# Patient Record
Sex: MALE | Race: WHITE | NOT HISPANIC OR LATINO | Employment: FULL TIME | URBAN - METROPOLITAN AREA
[De-identification: names, ages, dates, MRNs, and addresses within clinical notes are randomized per-mention and may not be internally consistent; named-entity substitution may affect disease eponyms.]

---

## 2018-01-03 ENCOUNTER — ALLSCRIPTS OFFICE VISIT (OUTPATIENT)
Dept: OTHER | Facility: OTHER | Age: 43
End: 2018-01-03

## 2018-01-03 ENCOUNTER — GENERIC CONVERSION - ENCOUNTER (OUTPATIENT)
Dept: OTHER | Facility: OTHER | Age: 43
End: 2018-01-03

## 2018-01-23 VITALS
BODY MASS INDEX: 26.04 KG/M2 | DIASTOLIC BLOOD PRESSURE: 84 MMHG | SYSTOLIC BLOOD PRESSURE: 130 MMHG | HEIGHT: 71 IN | WEIGHT: 186 LBS

## 2019-01-03 DIAGNOSIS — N40.0 ENLARGED PROSTATE WITHOUT LOWER URINARY TRACT SYMPTOMS (LUTS): ICD-10-CM

## 2019-01-16 ENCOUNTER — OFFICE VISIT (OUTPATIENT)
Dept: UROLOGY | Facility: MEDICAL CENTER | Age: 44
End: 2019-01-16
Payer: COMMERCIAL

## 2019-01-16 VITALS
WEIGHT: 185 LBS | HEART RATE: 57 BPM | HEIGHT: 70 IN | BODY MASS INDEX: 26.48 KG/M2 | SYSTOLIC BLOOD PRESSURE: 122 MMHG | DIASTOLIC BLOOD PRESSURE: 78 MMHG

## 2019-01-16 DIAGNOSIS — Z80.42 FAMILY HISTORY OF PROSTATE CANCER IN FATHER: ICD-10-CM

## 2019-01-16 DIAGNOSIS — N40.1 BENIGN PROSTATIC HYPERPLASIA WITH LOWER URINARY TRACT SYMPTOMS, SYMPTOM DETAILS UNSPECIFIED: Primary | ICD-10-CM

## 2019-01-16 LAB
POST-VOID RESIDUAL VOLUME, ML POC: 23 ML
SL AMB  POCT GLUCOSE, UA: NORMAL
SL AMB LEUKOCYTE ESTERASE,UA: NORMAL
SL AMB POCT BILIRUBIN,UA: NORMAL
SL AMB POCT BLOOD,UA: NORMAL
SL AMB POCT CLARITY,UA: CLEAR
SL AMB POCT COLOR,UA: NORMAL
SL AMB POCT KETONES,UA: NORMAL
SL AMB POCT NITRITE,UA: NORMAL
SL AMB POCT PH,UA: 6
SL AMB POCT SPECIFIC GRAVITY,UA: 1.02
SL AMB POCT URINE PROTEIN: NORMAL
SL AMB POCT UROBILINOGEN: 0.2

## 2019-01-16 PROCEDURE — 87086 URINE CULTURE/COLONY COUNT: CPT | Performed by: UROLOGY

## 2019-01-16 PROCEDURE — 51798 US URINE CAPACITY MEASURE: CPT | Performed by: UROLOGY

## 2019-01-16 PROCEDURE — 81003 URINALYSIS AUTO W/O SCOPE: CPT | Performed by: UROLOGY

## 2019-01-16 PROCEDURE — 99214 OFFICE O/P EST MOD 30 MIN: CPT | Performed by: UROLOGY

## 2019-01-16 NOTE — LETTER
January 16, 2019     Norris Gonzales MD  [facility] [id number]    Patient: Maddi Wynn   YOB: 1975   Date of Visit: 1/16/2019       Dear Dr Abdulkadir Esquivel:    Thank you for referring Dino Morales to me for evaluation  Below are my notes for this consultation  If you have questions, please do not hesitate to call me  I look forward to following your patient along with you  Sincerely,        Cehnte Casas MD        CC: No Recipients  Chente Casas MD  1/16/2019 10:22 AM  Sign at close encounter  Assessment/Plan:  1  BPH with lower urinary tract symptoms-plan cystoscopy, PVR only 23 cc, urine culture and sensitivity because a low-grade leukocyte esterase on urinalysis, PSA and chemistry profile  No problem-specific Assessment & Plan notes found for this encounter  Diagnoses and all orders for this visit:    Benign prostatic hyperplasia with lower urinary tract symptoms, symptom details unspecified  -     POCT Measure PVR  -     POCT urine dip auto non-scope  -     PSA, total and free; Future  -     Comprehensive metabolic panel; Future  -     Cystoscopy; Future  -     Urine culture; Future    Family history of prostate cancer in father          Subjective:      Patient ID: Maddi Wynn is a 37 y o  male  Chief complaint:  Voiding dysfunction    History of present illness this is a 80-year-old male with a father who had prostate cancer who presents for yearly prostate examination  The patient also notes some mild to moderate feelings of incomplete bladder emptying urinary frequency and some straining to urinate with more significant symptoms with respect to urinary intermittency weakening of the urinary stream and urinary urgency  He notes nocturia 0-1 time per night with an AUA symptom score of 19  He denies dysuria and gross hematuria noting no urinary incontinence    He denies any testicular or adnexal discomfort since previous vasectomy  Benign Prostatic Hypertrophy         The following portions of the patient's history were reviewed and updated as appropriate: allergies, current medications, past family history, past medical history, past social history, past surgical history and problem list     Review of Systems   Genitourinary:        See HPI   All other systems reviewed and are negative  Objective:      /78   Pulse 57   Ht 5' 10" (1 778 m)   Wt 83 9 kg (185 lb)   BMI 26 54 kg/m²           Physical Exam   Constitutional: He is oriented to person, place, and time  He appears well-developed and well-nourished  HENT:   Head: Normocephalic and atraumatic  Eyes: Conjunctivae and EOM are normal    Neck: Neck supple  Pulmonary/Chest: Effort normal  No respiratory distress  Abdominal: Soft  He exhibits no distension  There is no tenderness  There is no rebound  Genitourinary:   Genitourinary Comments: Phallus normal meatus patent normally placed  No cutaneous lesions, prepuce redundant  Scrotum normal without cutaneous lesions  Testes adnexa normal without masses induration tenderness or adnexal abnormality  Some slight nodularity of the spermatic cords in the area of the vasectomy is noted  Digital rectal examination reveals a normal anal verge normal anal sphincter tone no palpable rectal masses and a 25 g a nodular nontender prostate  Neurological: He is alert and oriented to person, place, and time  Skin: Skin is warm  Psychiatric: He has a normal mood and affect  His behavior is normal  Judgment and thought content normal    Vitals reviewed

## 2019-01-16 NOTE — PROGRESS NOTES
Assessment/Plan:  1  BPH with lower urinary tract symptoms-plan cystoscopy, PVR only 23 cc, urine culture and sensitivity because a low-grade leukocyte esterase on urinalysis, PSA and chemistry profile  No problem-specific Assessment & Plan notes found for this encounter  Diagnoses and all orders for this visit:    Benign prostatic hyperplasia with lower urinary tract symptoms, symptom details unspecified  -     POCT Measure PVR  -     POCT urine dip auto non-scope  -     PSA, total and free; Future  -     Comprehensive metabolic panel; Future  -     Cystoscopy; Future  -     Urine culture; Future    Family history of prostate cancer in father          Subjective:      Patient ID: Raleigh German is a 37 y o  male  Chief complaint:  Voiding dysfunction    History of present illness this is a 19-year-old male with a father who had prostate cancer who presents for yearly prostate examination  The patient also notes some mild to moderate feelings of incomplete bladder emptying urinary frequency and some straining to urinate with more significant symptoms with respect to urinary intermittency weakening of the urinary stream and urinary urgency  He notes nocturia 0-1 time per night with an AUA symptom score of 19  He denies dysuria and gross hematuria noting no urinary incontinence  He denies any testicular or adnexal discomfort since previous vasectomy  Benign Prostatic Hypertrophy         The following portions of the patient's history were reviewed and updated as appropriate: allergies, current medications, past family history, past medical history, past social history, past surgical history and problem list     Review of Systems   Genitourinary:        See HPI   All other systems reviewed and are negative          Objective:      /78   Pulse 57   Ht 5' 10" (1 778 m)   Wt 83 9 kg (185 lb)   BMI 26 54 kg/m²          Physical Exam   Constitutional: He is oriented to person, place, and time  He appears well-developed and well-nourished  HENT:   Head: Normocephalic and atraumatic  Eyes: Conjunctivae and EOM are normal    Neck: Neck supple  Pulmonary/Chest: Effort normal  No respiratory distress  Abdominal: Soft  He exhibits no distension  There is no tenderness  There is no rebound  Genitourinary:   Genitourinary Comments: Phallus normal meatus patent normally placed  No cutaneous lesions, prepuce redundant  Scrotum normal without cutaneous lesions  Testes adnexa normal without masses induration tenderness or adnexal abnormality  Some slight nodularity of the spermatic cords in the area of the vasectomy is noted  Digital rectal examination reveals a normal anal verge normal anal sphincter tone no palpable rectal masses and a 25 g a nodular nontender prostate  Neurological: He is alert and oriented to person, place, and time  Skin: Skin is warm  Psychiatric: He has a normal mood and affect  His behavior is normal  Judgment and thought content normal    Vitals reviewed

## 2019-01-17 LAB — BACTERIA UR CULT: NORMAL

## 2019-02-28 ENCOUNTER — PROCEDURE VISIT (OUTPATIENT)
Dept: UROLOGY | Facility: MEDICAL CENTER | Age: 44
End: 2019-02-28
Payer: COMMERCIAL

## 2019-02-28 VITALS
BODY MASS INDEX: 26.48 KG/M2 | DIASTOLIC BLOOD PRESSURE: 86 MMHG | SYSTOLIC BLOOD PRESSURE: 140 MMHG | HEIGHT: 70 IN | WEIGHT: 185 LBS | HEART RATE: 75 BPM

## 2019-02-28 DIAGNOSIS — Z80.42 FAMILY HISTORY OF PROSTATE CANCER IN FATHER: ICD-10-CM

## 2019-02-28 DIAGNOSIS — N40.1 BENIGN PROSTATIC HYPERPLASIA WITH LOWER URINARY TRACT SYMPTOMS, SYMPTOM DETAILS UNSPECIFIED: Primary | ICD-10-CM

## 2019-02-28 LAB
SL AMB  POCT GLUCOSE, UA: ABNORMAL
SL AMB LEUKOCYTE ESTERASE,UA: ABNORMAL
SL AMB POCT BILIRUBIN,UA: ABNORMAL
SL AMB POCT BLOOD,UA: ABNORMAL
SL AMB POCT CLARITY,UA: CLEAR
SL AMB POCT COLOR,UA: YELLOW
SL AMB POCT KETONES,UA: ABNORMAL
SL AMB POCT NITRITE,UA: ABNORMAL
SL AMB POCT PH,UA: 5
SL AMB POCT SPECIFIC GRAVITY,UA: 1.02
SL AMB POCT URINE PROTEIN: ABNORMAL
SL AMB POCT UROBILINOGEN: 0.2

## 2019-02-28 PROCEDURE — 99213 OFFICE O/P EST LOW 20 MIN: CPT | Performed by: UROLOGY

## 2019-02-28 PROCEDURE — 81003 URINALYSIS AUTO W/O SCOPE: CPT | Performed by: UROLOGY

## 2019-02-28 PROCEDURE — 52000 CYSTOURETHROSCOPY: CPT | Performed by: UROLOGY

## 2019-02-28 RX ORDER — SULFAMETHOXAZOLE AND TRIMETHOPRIM 800; 160 MG/1; MG/1
1 TABLET ORAL EVERY 12 HOURS SCHEDULED
Qty: 4 TABLET | Refills: 0 | Status: SHIPPED | OUTPATIENT
Start: 2019-02-28 | End: 2019-03-02

## 2019-02-28 NOTE — PROGRESS NOTES
Assessment/Plan:  1  Family history prostate cancer-no evidence of disease in this patient-repeat STANTON and PSA in 1 year    2  Weakening of the urinary stream-no evidence of significant bladder outlet obstruction  Will watch symptom complex and if this progresses consideration towards alpha blockade will be made  If that proves the patient's urinary flow consideration towards Uro lift will be made however my preference is not to introduce any of these treatments it unless necessary in this young patient  No problem-specific Assessment & Plan notes found for this encounter  Diagnoses and all orders for this visit:    Benign prostatic hyperplasia with lower urinary tract symptoms, symptom details unspecified  -     sulfamethoxazole-trimethoprim (BACTRIM DS) 800-160 mg per tablet; Take 1 tablet by mouth every 12 (twelve) hours for 2 days  -     PSA Total, Diagnostic; Future  -     Comprehensive metabolic panel; Future    Family history of prostate cancer in father  -     PSA Total, Diagnostic; Future    Other orders  -     Cystoscopy          Subjective:      Patient ID: Ene Fitzgerald is a 37 y o  male  Chief complaint weakened urinary stream    History of present illness 59-year-old male presented for follow-up of prostate cancer screening because of a family history of prostate cancer  He denies significant dysuria or gross hematuria noting nocturia 0 sometimes 1 time per night  He has some weakening of the urinary stream however he is emptying his bladder adequately denying any urgency or frequency  He presents for cystoscopy for evaluation of the bladder outlet    PSA is normal at 1 2 in this 59-year-old      The following portions of the patient's history were reviewed and updated as appropriate: allergies, current medications, past family history, past medical history, past social history, past surgical history and problem list     Review of Systems   Genitourinary:        See HPI   All other systems reviewed and are negative  Objective:      /86 (BP Location: Left arm, Patient Position: Sitting, Cuff Size: Adult)   Pulse 75   Ht 5' 10" (1 778 m)   Wt 83 9 kg (185 lb)   BMI 26 54 kg/m²          Physical Exam   Constitutional: He is oriented to person, place, and time  He appears well-developed and well-nourished  No distress  HENT:   Head: Atraumatic  Eyes: EOM are normal    Neck: Normal range of motion  Neck supple  Pulmonary/Chest: Effort normal  No respiratory distress  Abdominal: Soft  He exhibits no distension  Genitourinary: Penis normal    Neurological: He is alert and oriented to person, place, and time  Psychiatric: He has a normal mood and affect  His behavior is normal  Judgment and thought content normal    Vitals reviewed  Cystoscopy  Date/Time: 2/28/2019 11:30 AM  Performed by: Marilou Jones MD  Authorized by: Marilou Jones MD     Procedure details: cystoscopy    Patient tolerance: Patient tolerated the procedure well with no immediate complications    Additional Procedure Details: The patient was placed in lithotomy position and urethral meatus prepped with Betadine with 2% lidocaine lubricant instilled in the urethral lumen for 5 minutes  Flexible cystoscopy revealed a normal anterior urethra without stricture lesion  The prostatic urethra revealed only minimal enlargement of the lateral lobes of the prostate without visual occlusion of the bladder outlet  Urinary bladder was mildly trabeculated without intrinsic lesions or extrinsic mass compression  Ureteral orifices were normal in configuration bilaterally with clear efflux bilaterally      The procedure was well tolerated normal

## 2019-02-28 NOTE — LETTER
February 28, 2019     Domitila Dubose MD  [facility] [id number]    Patient: Corina Kan   YOB: 1975   Date of Visit: 2/28/2019       Dear Dr Hesham Romano:    Thank you for referring Susannah Mcpherson to me for evaluation  Below are my notes for this consultation  If you have questions, please do not hesitate to call me  I look forward to following your patient along with you  Sincerely,        Murray Sotelo MD        CC: No Recipients  Murray Sotelo MD  2/28/2019 11:32 AM  Sign at close encounter  Assessment/Plan:  1  Family history prostate cancer-no evidence of disease in this patient-repeat STANTON and PSA in 1 year    2  Weakening of the urinary stream-no evidence of significant bladder outlet obstruction  Will watch symptom complex and if this progresses consideration towards alpha blockade will be made  If that proves the patient's urinary flow consideration towards Uro lift will be made however my preference is not to introduce any of these treatments it unless necessary in this young patient  No problem-specific Assessment & Plan notes found for this encounter  Diagnoses and all orders for this visit:    Benign prostatic hyperplasia with lower urinary tract symptoms, symptom details unspecified  -     sulfamethoxazole-trimethoprim (BACTRIM DS) 800-160 mg per tablet; Take 1 tablet by mouth every 12 (twelve) hours for 2 days  -     PSA Total, Diagnostic; Future  -     Comprehensive metabolic panel; Future    Family history of prostate cancer in father  -     PSA Total, Diagnostic; Future    Other orders  -     Cystoscopy          Subjective:      Patient ID: Corina Kan is a 37 y o  male  Chief complaint weakened urinary stream    History of present illness 45-year-old male presented for follow-up of prostate cancer screening because of a family history of prostate cancer    He denies significant dysuria or gross hematuria noting nocturia 0 sometimes 1 time per night  He has some weakening of the urinary stream however he is emptying his bladder adequately denying any urgency or frequency  He presents for cystoscopy for evaluation of the bladder outlet  PSA is normal at 1 2 in this 51-year-old      The following portions of the patient's history were reviewed and updated as appropriate: allergies, current medications, past family history, past medical history, past social history, past surgical history and problem list     Review of Systems   Genitourinary:        See HPI   All other systems reviewed and are negative  Objective:      /86 (BP Location: Left arm, Patient Position: Sitting, Cuff Size: Adult)   Pulse 75   Ht 5' 10" (1 778 m)   Wt 83 9 kg (185 lb)   BMI 26 54 kg/m²           Physical Exam   Constitutional: He is oriented to person, place, and time  He appears well-developed and well-nourished  No distress  HENT:   Head: Atraumatic  Eyes: EOM are normal    Neck: Normal range of motion  Neck supple  Pulmonary/Chest: Effort normal  No respiratory distress  Abdominal: Soft  He exhibits no distension  Genitourinary: Penis normal    Neurological: He is alert and oriented to person, place, and time  Psychiatric: He has a normal mood and affect  His behavior is normal  Judgment and thought content normal    Vitals reviewed  Cystoscopy  Date/Time: 2/28/2019 11:30 AM  Performed by: Jan Osborne MD  Authorized by: Jan Osborne MD     Procedure details: cystoscopy    Patient tolerance: Patient tolerated the procedure well with no immediate complications    Additional Procedure Details: The patient was placed in lithotomy position and urethral meatus prepped with Betadine with 2% lidocaine lubricant instilled in the urethral lumen for 5 minutes  Flexible cystoscopy revealed a normal anterior urethra without stricture lesion    The prostatic urethra revealed only minimal enlargement of the lateral lobes of the prostate without visual occlusion of the bladder outlet  Urinary bladder was mildly trabeculated without intrinsic lesions or extrinsic mass compression  Ureteral orifices were normal in configuration bilaterally with clear efflux bilaterally      The procedure was well tolerated normal

## 2020-03-02 ENCOUNTER — TELEPHONE (OUTPATIENT)
Dept: UROLOGY | Facility: MEDICAL CENTER | Age: 45
End: 2020-03-02

## 2020-03-02 NOTE — TELEPHONE ENCOUNTER
Called patient - he is reminded to have bloodwork done prior to his appointment on 03/04/20  Patient requesting 90 day supply - please advise

## 2021-05-03 ENCOUNTER — TELEPHONE (OUTPATIENT)
Dept: UROLOGY | Facility: AMBULATORY SURGERY CENTER | Age: 46
End: 2021-05-03

## 2021-05-03 DIAGNOSIS — N13.8 BPH WITH OBSTRUCTION/LOWER URINARY TRACT SYMPTOMS: Primary | ICD-10-CM

## 2021-05-03 DIAGNOSIS — N40.1 BPH WITH OBSTRUCTION/LOWER URINARY TRACT SYMPTOMS: Primary | ICD-10-CM

## 2021-05-03 NOTE — TELEPHONE ENCOUNTER
Patient managed by Dr Chapa [facility] patient called in requesting new psa script to be emailed to:  [email or web address]    Patient can be reached at [phone]

## 2021-05-20 ENCOUNTER — APPOINTMENT (OUTPATIENT)
Dept: LAB | Facility: MEDICAL CENTER | Age: 46
End: 2021-05-20
Attending: UROLOGY
Payer: COMMERCIAL

## 2021-05-20 DIAGNOSIS — N40.1 BPH WITH OBSTRUCTION/LOWER URINARY TRACT SYMPTOMS: ICD-10-CM

## 2021-05-20 DIAGNOSIS — N13.8 BPH WITH OBSTRUCTION/LOWER URINARY TRACT SYMPTOMS: ICD-10-CM

## 2021-05-20 LAB — PSA SERPL-MCNC: 1 NG/ML (ref 0–4)

## 2021-05-20 PROCEDURE — 84153 ASSAY OF PSA TOTAL: CPT

## 2021-05-27 ENCOUNTER — OFFICE VISIT (OUTPATIENT)
Dept: UROLOGY | Facility: AMBULATORY SURGERY CENTER | Age: 46
End: 2021-05-27
Payer: COMMERCIAL

## 2021-05-27 VITALS
WEIGHT: 184 LBS | HEIGHT: 70 IN | BODY MASS INDEX: 26.34 KG/M2 | SYSTOLIC BLOOD PRESSURE: 138 MMHG | DIASTOLIC BLOOD PRESSURE: 88 MMHG | HEART RATE: 69 BPM

## 2021-05-27 DIAGNOSIS — N13.8 BPH WITH OBSTRUCTION/LOWER URINARY TRACT SYMPTOMS: Primary | ICD-10-CM

## 2021-05-27 DIAGNOSIS — Z12.5 SCREENING FOR PROSTATE CANCER: ICD-10-CM

## 2021-05-27 DIAGNOSIS — N40.1 BPH WITH OBSTRUCTION/LOWER URINARY TRACT SYMPTOMS: Primary | ICD-10-CM

## 2021-05-27 LAB — POST-VOID RESIDUAL VOLUME, ML POC: 110 ML

## 2021-05-27 PROCEDURE — 51798 US URINE CAPACITY MEASURE: CPT | Performed by: UROLOGY

## 2021-05-27 PROCEDURE — 99213 OFFICE O/P EST LOW 20 MIN: CPT | Performed by: UROLOGY

## 2021-05-27 NOTE — PROGRESS NOTES
5/27/2021    Simon Arce  1975  [id number]        Assessment  Routine prostate cancer screening      Discussion   we discussed his PSA is stable at 1 0 and his digital rectal examination is within normal limits  With regards to prostate cancer screening I recommend follow-up in 2 years with his next PSA and digital rectal examination  With regards to his lower urinary tract symptoms they are quite mild with minimal bother and he will continue with conservative management at this time  I would hold on alpha blockade  If he has worsening of his lower urinary tract symptoms we discussed possibly performing cystoscopy to exclude a urethral stricture  The patient is amenable with this plan  History of Present Illness  39 y o  male with a history of  An uncle and grandfather with prostate cancer  He states that his father does not have prostate cancer  He is previously known to Dr Xavier Gold  He presents today for continued routine prostate cancer screening  A recent PSA level from May 2021 is noted to be 1 0  He states that he has a weak urinary stream but has minimal bother from this and minimal nocturia  For the most part he feels like he empties well  He had a PVR today which was 110 cc, however, he states that he was able to void immediately after  He denies seen hematuria  He states that he is in good health  He is  with 3 children   And works in commercial construction  AUA Symptom Score  AUA SYMPTOM SCORE      Most Recent Value   AUA SYMPTOM SCORE   How often have you had a sensation of not emptying your bladder completely after you finished urinating? 0   How often have you had to urinate again less than two hours after you finished urinating? 1   How often have you found you stopped and started again several times when you urinate? 3   How often have you found it difficult to postpone urination?   0   How often have you had a weak urinary stream?  4   How often have you had to push or strain to begin urination? 1   How many times did you most typically get up to urinate from the time you went to bed at night until the time you got up in the morning? 1   Quality of Life: If you were to spend the rest of your life with your urinary condition just the way it is now, how would you feel about that?  2   AUA SYMPTOM SCORE  10          Review of Systems  Review of Systems   Constitutional: Negative  HENT: Negative  Eyes: Negative  Respiratory: Negative  Cardiovascular: Negative  Gastrointestinal: Negative  Endocrine: Negative  Genitourinary:        Per HPI   Musculoskeletal: Negative  Skin: Negative  Allergic/Immunologic: Negative  Neurological: Negative  Hematological: Negative  Psychiatric/Behavioral: Negative  Past Medical History  Past Medical History:   Diagnosis Date    Benign prostatic hyperplasia with lower urinary tract symptoms 2016    Nocturia     Other specified disorders of the male genital organs        Past Social History  Past Surgical History:   Procedure Laterality Date    VASECTOMY  2014       Past Family History  Family History   Problem Relation Age of Onset    Breast cancer Paternal Grandmother     Prostate cancer Paternal Uncle        Past Social history  Social History     Socioeconomic History    Marital status: /Civil Union     Spouse name: Not on file    Number of children: Not on file    Years of education: Not on file    Highest education level: Not on file   Occupational History    Not on file   Social Needs    Financial resource strain: Not on file    Food insecurity     Worry: Not on file     Inability: Not on file    Transportation needs     Medical: Not on file     Non-medical: Not on file   Tobacco Use    Smoking status: Never Smoker    Smokeless tobacco: Never Used   Substance and Sexual Activity    Alcohol use:  Yes     Alcohol/week: 3 0 standard drinks     Types: 3 Cans of beer per week    Drug use: No    Sexual activity: Not on file   Lifestyle    Physical activity     Days per week: Not on file     Minutes per session: Not on file    Stress: Not on file   Relationships    Social connections     Talks on phone: Not on file     Gets together: Not on file     Attends Holiness service: Not on file     Active member of club or organization: Not on file     Attends meetings of clubs or organizations: Not on file     Relationship status: Not on file    Intimate partner violence     Fear of current or ex partner: Not on file     Emotionally abused: Not on file     Physically abused: Not on file     Forced sexual activity: Not on file   Other Topics Concern    Not on file   Social History Narrative    Not on file       Current Medications  No current outpatient medications on file  No current facility-administered medications for this visit  Allergies  Allergies   Allergen Reactions    Shellfish Allergy - Food Allergy      Other reaction(s): throat tightness       Past Medical History, Social History, Family History, medications and allergies were reviewed  Vitals  Vitals:    05/27/21 0850   Weight: 83 5 kg (184 lb)   Height: 5' 10" (1 778 m)       Physical Exam  Physical Exam     on examination he is in no acute distress  His abdomen is soft nontender nondistended   examination reveals normal phallus, scrotum and scrotal contents  Digital rectal examination reveals a 35-40 g prostate which is firm but without nodularity  Skin is warm  Extremities without edema    Neurologic is grossly intact and nonfocal   Gait normal   Affect normal    Results  Lab Results   Component Value Date    PSA 1 0 05/20/2021     No results found for: GLUCOSE, CALCIUM, NA, K, CO2, CL, BUN, CREATININE  No results found for: WBC, HGB, HCT, MCV, PLT      Office Urine Dip  No results found for this or any previous visit (from the past 1 hour(s)) ]

## 2023-05-24 ENCOUNTER — TELEPHONE (OUTPATIENT)
Dept: UROLOGY | Facility: AMBULATORY SURGERY CENTER | Age: 48
End: 2023-05-24

## 2023-05-24 ENCOUNTER — APPOINTMENT (OUTPATIENT)
Dept: LAB | Facility: MEDICAL CENTER | Age: 48
End: 2023-05-24

## 2023-05-24 DIAGNOSIS — Z12.5 SCREENING FOR PROSTATE CANCER: ICD-10-CM

## 2023-05-24 LAB — PSA SERPL-MCNC: 1.32 NG/ML (ref 0–4)

## 2023-05-24 NOTE — TELEPHONE ENCOUNTER
Called patient to let him know that he does need his PSA prior to his appt with us  Pt stated that he will have it completed by his appt date

## 2023-05-30 ENCOUNTER — OFFICE VISIT (OUTPATIENT)
Dept: UROLOGY | Facility: AMBULATORY SURGERY CENTER | Age: 48
End: 2023-05-30

## 2023-05-30 VITALS
SYSTOLIC BLOOD PRESSURE: 130 MMHG | OXYGEN SATURATION: 97 % | WEIGHT: 190 LBS | HEIGHT: 70 IN | RESPIRATION RATE: 18 BRPM | BODY MASS INDEX: 27.2 KG/M2 | DIASTOLIC BLOOD PRESSURE: 80 MMHG | HEART RATE: 78 BPM

## 2023-05-30 DIAGNOSIS — Z12.5 SCREENING FOR PROSTATE CANCER: Primary | ICD-10-CM

## 2023-05-30 NOTE — LETTER
May 30, 2023     Josiah Eaton, [address] [facility] [id number]    Patient: Brian Mayers   YOB: 1975   Date of Visit: 5/30/2023       Dear Dr Hawk Sen:    Thank you for referring Cristina Russ to me for evaluation  Below are my notes for this consultation  If you have questions, please do not hesitate to call me  I look forward to following your patient along with you  Sincerely,        Sally Benz MD        CC: No Recipients    Sally Benz MD  5/30/2023 12:04 PM  Sign when Signing Visit  5/30/2023    Jevon Arce  1975  [id number]        Assessment  Routine prostate cancer screening, family history of prostate cancer, mild erectile dysfunction      Discussion  I provided the patient with reassurance that over the course of the last 2 years his PSA is relatively unchanged from 1 0 in 2021 up to 1 32 from May 2023  I recommend that he continue to follow on an every other year basis until 54years of age when he can then follow on a yearly basis  He will continue with generic Viagra as needed  History of Present Illness  52 y o  male with a history of prostate cancer in his family  He states both his uncle and grandfather had prostate cancer  His uncle unfortunately passed away from prostate cancer  He was last seen by me 2 years ago in May 2021  His PSA level at that time was 1 0  His most recent PSA from May 2023 is 1 32  He states he has mild erectile dysfunction and has been using generic Viagra which he obtained online  AUA Symptom Score      Review of Systems  Review of Systems   Constitutional: Negative  HENT: Negative  Eyes: Negative  Respiratory: Negative  Cardiovascular: Negative  Gastrointestinal: Negative  Endocrine: Negative  Genitourinary:        Per HPI   Musculoskeletal: Negative  Skin: Negative  Allergic/Immunologic: Negative  Neurological: Negative  Hematological: Negative  Psychiatric/Behavioral: Negative  Past Medical History  Past Medical History:   Diagnosis Date   • Benign prostatic hyperplasia with lower urinary tract symptoms 2016   • Nocturia    • Other specified disorders of the male genital organs        Past Social History  Past Surgical History:   Procedure Laterality Date   • VASECTOMY  2014       Past Family History  Family History   Problem Relation Age of Onset   • Breast cancer Paternal Grandmother    • Prostate cancer Paternal Uncle        Past Social history  Social History     Socioeconomic History   • Marital status: /Civil Union     Spouse name: Not on file   • Number of children: Not on file   • Years of education: Not on file   • Highest education level: Not on file   Occupational History   • Not on file   Tobacco Use   • Smoking status: Never   • Smokeless tobacco: Never   Substance and Sexual Activity   • Alcohol use: Yes     Alcohol/week: 3 0 standard drinks of alcohol     Types: 3 Cans of beer per week   • Drug use: No   • Sexual activity: Not on file   Other Topics Concern   • Not on file   Social History Narrative   • Not on file     Social Determinants of Health     Financial Resource Strain: Not on file   Food Insecurity: Not on file   Transportation Needs: Not on file   Physical Activity: Not on file   Stress: Not on file   Social Connections: Not on file   Intimate Partner Violence: Not on file   Housing Stability: Not on file       Current Medications  No current outpatient medications on file  No current facility-administered medications for this visit  Allergies  Allergies   Allergen Reactions   • Shellfish Allergy - Food Allergy      Other reaction(s): throat tightness       Past Medical History, Social History, Family History, medications and allergies were reviewed      Vitals  Vitals:    05/30/23 1131   BP: 130/80   BP Location: Right arm   Patient Position: Standing   Cuff Size: Standard "  Pulse: 78   Resp: 18   SpO2: 97%   Weight: 86 2 kg (190 lb)   Height: 5' 10\" (1 778 m)       Physical Exam  Physical Exam  On examination he is in no acute distress  His abdomen is soft nontender nondistended   examination reveals normal phallus, scrotum and scrotal contents  Digital rectal examination reveals a 40 g prostate which is smooth and firm without nodularity  Skin is warm  Extremities without edema    Neurologic is grossly intact and nonfocal   Gait normal   Affect normal      Results  Lab Results   Component Value Date    PSA 1 32 05/24/2023    PSA 1 0 05/20/2021     No results found for: \"BUN\", \"CALCIUM\", \"CL\", \"CO2\", \"CREATININE\", \"GLUCOSE\", \"K\", \"NA\"  No results found for: \"HCT\", \"HGB\", \"MCV\", \"PLT\", \"WBC\"      Office Urine Dip  No results found for this or any previous visit (from the past 1 hour(s)) ]      "

## 2023-05-30 NOTE — PROGRESS NOTES
5/30/2023    Zenaida Arce  1975  [id number]        Assessment  Routine prostate cancer screening, family history of prostate cancer, mild erectile dysfunction      Discussion  I provided the patient with reassurance that over the course of the last 2 years his PSA is relatively unchanged from 1 0 in 2021 up to 1 32 from May 2023  I recommend that he continue to follow on an every other year basis until 54years of age when he can then follow on a yearly basis  He will continue with generic Viagra as needed  History of Present Illness  52 y o  male with a history of prostate cancer in his family  He states both his uncle and grandfather had prostate cancer  His uncle unfortunately passed away from prostate cancer  He was last seen by me 2 years ago in May 2021  His PSA level at that time was 1 0  His most recent PSA from May 2023 is 1 32  He states he has mild erectile dysfunction and has been using generic Viagra which he obtained online  AUA Symptom Score      Review of Systems  Review of Systems   Constitutional: Negative  HENT: Negative  Eyes: Negative  Respiratory: Negative  Cardiovascular: Negative  Gastrointestinal: Negative  Endocrine: Negative  Genitourinary:        Per HPI   Musculoskeletal: Negative  Skin: Negative  Allergic/Immunologic: Negative  Neurological: Negative  Hematological: Negative  Psychiatric/Behavioral: Negative            Past Medical History  Past Medical History:   Diagnosis Date   • Benign prostatic hyperplasia with lower urinary tract symptoms 2016   • Nocturia    • Other specified disorders of the male genital organs        Past Social History  Past Surgical History:   Procedure Laterality Date   • VASECTOMY  2014       Past Family History  Family History   Problem Relation Age of Onset   • Breast cancer Paternal Grandmother    • Prostate cancer Paternal Uncle        Past Social history  Social History "    Socioeconomic History   • Marital status: /Civil Union     Spouse name: Not on file   • Number of children: Not on file   • Years of education: Not on file   • Highest education level: Not on file   Occupational History   • Not on file   Tobacco Use   • Smoking status: Never   • Smokeless tobacco: Never   Substance and Sexual Activity   • Alcohol use: Yes     Alcohol/week: 3 0 standard drinks of alcohol     Types: 3 Cans of beer per week   • Drug use: No   • Sexual activity: Not on file   Other Topics Concern   • Not on file   Social History Narrative   • Not on file     Social Determinants of Health     Financial Resource Strain: Not on file   Food Insecurity: Not on file   Transportation Needs: Not on file   Physical Activity: Not on file   Stress: Not on file   Social Connections: Not on file   Intimate Partner Violence: Not on file   Housing Stability: Not on file       Current Medications  No current outpatient medications on file  No current facility-administered medications for this visit  Allergies  Allergies   Allergen Reactions   • Shellfish Allergy - Food Allergy      Other reaction(s): throat tightness       Past Medical History, Social History, Family History, medications and allergies were reviewed  Vitals  Vitals:    05/30/23 1131   BP: 130/80   BP Location: Right arm   Patient Position: Standing   Cuff Size: Standard   Pulse: 78   Resp: 18   SpO2: 97%   Weight: 86 2 kg (190 lb)   Height: 5' 10\" (1 778 m)       Physical Exam  Physical Exam  On examination he is in no acute distress  His abdomen is soft nontender nondistended   examination reveals normal phallus, scrotum and scrotal contents  Digital rectal examination reveals a 40 g prostate which is smooth and firm without nodularity  Skin is warm  Extremities without edema    Neurologic is grossly intact and nonfocal   Gait normal   Affect normal      Results  Lab Results   Component Value Date    PSA 1 32 05/24/2023    " "PSA 1 0 05/20/2021     No results found for: \"BUN\", \"CALCIUM\", \"CL\", \"CO2\", \"CREATININE\", \"GLUCOSE\", \"K\", \"NA\"  No results found for: \"HCT\", \"HGB\", \"MCV\", \"PLT\", \"WBC\"      Office Urine Dip  No results found for this or any previous visit (from the past 1 hour(s)) ]      "

## 2023-10-31 ENCOUNTER — OFFICE VISIT (OUTPATIENT)
Dept: DERMATOLOGY | Facility: CLINIC | Age: 48
End: 2023-10-31

## 2023-10-31 VITALS — BODY MASS INDEX: 28.27 KG/M2 | TEMPERATURE: 97.8 F | WEIGHT: 197 LBS

## 2023-10-31 DIAGNOSIS — D48.5 NEOPLASM OF UNCERTAIN BEHAVIOR OF SKIN: Primary | ICD-10-CM

## 2023-10-31 NOTE — PATIENT INSTRUCTIONS
III. POST-PROCEDURAL CARE (WHAT YOU WILL NEED TO DO "AFTER THE BIOPSY" TO OPTIMIZE HEALING)    Keep the area clean and dry. Try NOT to remove the bandage or get it wet for the first 24 hours. Gently clean the area and apply surgical ointment (such as Vaseline petrolatum ointment, which is available "over the counter" and not a prescription) to the biopsy site for up to 2 weeks straight. This acts to protect the wound from the outside world. Sutures are not usually placed in this procedure. If any sutures were placed, return for suture removal as instructed (generally 1 week for the face, 2 weeks for the body). Take Acetaminophen (Tylenol) for discomfort, if no contraindications. Ibuprofen or aspirin could make bleeding worse. Call our office immediately for signs of infection: fever, chills, increased redness, warmth, tenderness, discomfort/pain, or pus or foul smell coming from the wound. WHAT TO DO IF THERE IS ANY BLEEDING? If a small amount of bleeding is noticed, place a clean cloth over the area and apply firm pressure for ten minutes. Check the wound after 10 minutes of direct pressure. If bleeding persists, try one more time for an additional 10 minutes of direct pressure on the area. If the bleeding becomes heavier or does not stop after the second attempt, or if you have any other questions about this procedure, then please call your [address] by calling [phone] (SKIN).      I hereby acknowledge that I have reviewed and verified the site with my Dermatologist and have requested and authorized my Dermatologist to proceed with the   Esther Mccall

## 2023-10-31 NOTE — PROGRESS NOTES
[facility] Note     Patient Name: Idania Mares  Encounter Date: 10/31/23     Have you been cared for by a [facility]ologist in the last 3 years and, if so, which description applies to you? NO. I am considered a "new" patient and must complete all patient intake questions. I am MALE/not capable of bearing children. REVIEW OF SYSTEMS:  Have you recently had or currently have any of the following? Recent fever or chills? No  Any non-healing wound? No   PAST MEDICAL HISTORY:  Have you personally ever had or currently have any of the following? If "YES," then please provide more detail. Skin cancer (such as Melanoma, Basal Cell Carcinoma, Squamous Cell Carcinoma? No  Tuberculosis, HIV/AIDS, Hepatitis B or C: No  Radiation Treatment No   HISTORY OF IMMUNOSUPPRESSION:   Do you have a history of any of the following:  Systemic Immunosuppression such as Diabetes, Biologic or Immunotherapy, Chemotherapy, Organ Transplantation, Bone Marrow Transplantation? No     Answering "YES" requires the addition of the dotphrase "IMMUNOSUPPRESSED" as the first diagnosis of the patient's visit. FAMILY HISTORY:  Any "first degree relatives" (parent, brother, sister, or child) with the following? Skin Cancer, Pancreatic or Other Cancer? No   PATIENT EXPERIENCE:    Do you want the Dermatologist to perform a COMPLETE skin exam today including a clinical examination under the "bra and underwear" areas? NO  If necessary, do we have your permission to call and leave a detailed message on your Preferred Phone number that includes your specific medical information? Yes      Allergies   Allergen Reactions    Shellfish Allergy - Food Allergy      Other reaction(s): throat tightness    No current outpatient medications on file. Whom besides the patient is providing clinical information about today's encounter?    NO ADDITIONAL HISTORIAN (patient alone provided history)    Physical Exam and Assessment/Plan by Diagnosis:        NEOPLASM OF UNCERTAIN BEHAVIOR OF SKIN    Physical Exam:  (Anatomic Location); (Size and Morphological Description); (Differential Diagnosis):  Left lower leg 1.4 x 1.0cm pearly nodule; Differential diagnosis: rule out basal cell   Pertinent Positives:  Pertinent Negatives: Additional History of Present Condition:  Lesion on the left lower leg, present for 2-3 years. Enlarging slowly, no itching or bleeding. Assessment and Plan:  I have discussed with the patient that a sample of skin via a "skin biopsy” would be potentially helpful to further make a specific diagnosis under the microscope. Based on a thorough discussion of this condition and the management approach to it (including a comprehensive discussion of the known risks, side effects and potential benefits of treatment), the patient (family) agrees to implement the following specific plan:    Procedure:  Skin Biopsy. After a thorough discussion of treatment options and risk/benefits/alternatives (including but not limited to local pain, scarring, dyspigmentation, blistering, possible superinfection, and inability to confirm a diagnosis via histopathology), verbal and written consent were obtained and portion of the rash was biopsied for tissue sample. See below for consent that was obtained from patient and subsequent Procedure Note. PROCEDURE TANGENTIAL (SHAVE) BIOPSY NOTE:    Performing Physician: Rollo Jeans  Anatomic Location; Clinical Description with size (cm); Pre-Op Diagnosis:   Left lower leg 1.4 x 1.0cm pearly nodule; Differential diagnosis: rule out basal cell   Post-op diagnosis: Same     Local anesthesia: 1% xylocaine with epi      Topical anesthesia: None    Hemostasis: Aluminum chloride       After obtaining informed consent  at which time there was a discussion about the purpose of biopsy  and low risks of infection and bleeding. The area was prepped and draped in the usual fashion.  Anesthesia was obtained with 1% lidocaine with epinephrine. A shave biopsy to an appropriate sampling depth was obtained by Shave (Dermablade or 15 blade) The resulting wound was covered with surgical ointment and bandaged appropriately. The patient tolerated the procedure well without complications and was without signs of functional compromise. Specimen has been sent for review by Dermatopathology. Standard post-procedure care has been explained and has been included in written form within the patient's copy of Informed Consent. INFORMED CONSENT DISCUSSION AND POST-OPERATIVE INSTRUCTIONS FOR PATIENT    I.  RATIONALE FOR PROCEDURE  I understand that a skin biopsy allows the Dermatologist to test a lesion or rash under the microscope to obtain a diagnosis. It usually involves numbing the area with numbing medication and removing a small piece of skin; sometimes the area will be closed with sutures. In this specific procedure, sutures are not usually needed. If any sutures are placed, then they are usually need to be removed in 2 weeks or less. I understand that my Dermatologist recommends that a skin "shave" biopsy be performed today. A local anesthetic, similar to the kind that a dentist uses when filling a cavity, will be injected with a very small needle into the skin area to be sampled. The injected skin and tissue underneath "will go to sleep” and become numb so no pain should be felt afterwards. An instrument shaped like a tiny "razor blade" (shave biopsy instrument) will be used to cut a small piece of tissue and skin from the area so that a sample of tissue can be taken and examined more closely under the microscope. A slight amount of bleeding will occur, but it will be stopped with direct pressure and a pressure bandage and any other appropriate methods. I understands that a scar will form where the wound was created. Surgical ointment will be applied to help protect the wound.   Sutures are not usually needed. II.  RISKS AND POTENTIAL COMPLICATIONS   I understand the risks and potential complications of a skin biopsy include but are not limited to the following:  Bleeding  Infection  Pain  Scar/keloid  Skin discoloration  Incomplete Removal  Recurrence  Nerve Damage/Numbness/Loss of Function  Allergic Reaction to Anesthesia  Biopsies are diagnostic procedures and based on findings additional treatment or evaluation may be required  Loss or destruction of specimen resulting in no additional findings    My Dermatologist has explained to me the nature of the condition, the nature of the procedure, and the benefits to be reasonably expected compared with alternative approaches. My Dermatologist has discussed the likelihood of major risks or complications of this procedure including the specific risks listed above, such as bleeding, infection, and scarring/keloid. I understand that a scar is expected after this procedure. I understand that my physician cannot predict if the scar will form a "keloid," which extends beyond the borders of the wound that is created. A keloid is a thick, painful, and bumpy scar. A keloid can be difficult to treat, as it does not always respond well to therapy, which includes injecting cortisone directly into the keloid every few weeks. While this usually reduces the pain and size of the scar, it does not eliminate it. I understand that photographs may be taken before and after the procedure. These will be maintained as part of the medical providers confidential records and may not be made available to me. I further authorize the medical provider to use the photographs for teaching purposes or to illustrate scientific papers, books, or lectures if in his/her judgment, medical research, education, or science may benefit from its use. I have had an opportunity to fully inquire about the risks and benefits of this procedure and its alternatives.    I have been given ample time and opportunity to ask questions and to seek a second opinion if I wished to do so. I acknowledge that there have specifically been no guarantees as to the cosmetic results from the procedure. I am aware that with any procedure there is always the possibility of an unexpected complication. III. POST-PROCEDURAL CARE (WHAT YOU WILL NEED TO DO "AFTER THE BIOPSY" TO OPTIMIZE HEALING)    Keep the area clean and dry. Try NOT to remove the bandage or get it wet for the first 24 hours. Gently clean the area and apply surgical ointment (such as Vaseline petrolatum ointment, which is available "over the counter" and not a prescription) to the biopsy site for up to 2 weeks straight. This acts to protect the wound from the outside world. Sutures are not usually placed in this procedure. If any sutures were placed, return for suture removal as instructed (generally 1 week for the face, 2 weeks for the body). Take Acetaminophen (Tylenol) for discomfort, if no contraindications. Ibuprofen or aspirin could make bleeding worse. Call our office immediately for signs of infection: fever, chills, increased redness, warmth, tenderness, discomfort/pain, or pus or foul smell coming from the wound. WHAT TO DO IF THERE IS ANY BLEEDING? If a small amount of bleeding is noticed, place a clean cloth over the area and apply firm pressure for ten minutes. Check the wound after 10 minutes of direct pressure. If bleeding persists, try one more time for an additional 10 minutes of direct pressure on the area. If the bleeding becomes heavier or does not stop after the second attempt, or if you have any other questions about this procedure, then please call your [facility] by calling [phone] (SKIN). I hereby acknowledge that I have reviewed and verified the site with my Dermatologist and have requested and authorized my Dermatologist to proceed with the procedure.     Scribe Attestation      I,:  Magdalena Gambino am acting as a scribe while in the presence of the attending physician.:       I,:  Vaughan Ahumada, MD personally performed the services described in this documentation    as scribed in my presence.:

## 2023-11-10 ENCOUNTER — TELEPHONE (OUTPATIENT)
Age: 48
End: 2023-11-10

## 2023-11-10 NOTE — TELEPHONE ENCOUNTER
Patient calling for his biopsy results. Advised that we have not received them yet, once received - the provider will reach out to further discuss.

## 2023-11-14 ENCOUNTER — TELEPHONE (OUTPATIENT)
Age: 48
End: 2023-11-14

## 2023-11-15 PROCEDURE — 88341 IMHCHEM/IMCYTCHM EA ADD ANTB: CPT | Performed by: DERMATOLOGY

## 2023-11-15 PROCEDURE — 88360 TUMOR IMMUNOHISTOCHEM/MANUAL: CPT | Performed by: DERMATOLOGY

## 2023-11-15 PROCEDURE — 88305 TISSUE EXAM BY PATHOLOGIST: CPT | Performed by: DERMATOLOGY

## 2023-11-15 PROCEDURE — 88342 IMHCHEM/IMCYTCHM 1ST ANTB: CPT | Performed by: DERMATOLOGY

## 2023-11-15 NOTE — TELEPHONE ENCOUNTER
Patient called in reference to bx results; I seen that it was pending, do we have an estimated time frame maybe to tell patient when it might be ready?

## 2023-11-30 ENCOUNTER — TELEPHONE (OUTPATIENT)
Dept: DERMATOLOGY | Facility: CLINIC | Age: 48
End: 2023-11-30

## 2023-11-30 NOTE — TELEPHONE ENCOUNTER
----- Message from Ana Leach MD sent at 11/27/2023 11:21 AM EST -----  I would prefer she do it if she is willing- it is a tough spot on the cruz. Thanks Karyle Oats!  ----- Message -----  From: Sara Coburn MA  Sent: 11/27/2023  11:13 AM EST  To: MD Dr Wei Clark can get him in on 12/11/23 for an excision if that ok. Let me know if that works and I can call him and schedule the appointment.

## 2023-11-30 NOTE — TELEPHONE ENCOUNTER
Received call from patient.       [facility] for 12/11/2023 at 11 am with Dr. Lori Cornell

## 2023-11-30 NOTE — TELEPHONE ENCOUNTER
Left message for patient to call my direct extension to schedule a surgical appointment with Dr Quincy Odonnell as requested by Dr Michelle Velásquez and approved by Dr Quincy Odonnell.   Please have patient speak to me when calls back.  [phone]

## 2023-12-11 ENCOUNTER — PROCEDURE VISIT (OUTPATIENT)
Dept: DERMATOLOGY | Facility: CLINIC | Age: 48
End: 2023-12-11

## 2023-12-11 VITALS — HEIGHT: 70 IN | BODY MASS INDEX: 28.35 KG/M2 | TEMPERATURE: 97.2 F | WEIGHT: 198 LBS

## 2023-12-11 DIAGNOSIS — D49.2: Primary | ICD-10-CM

## 2023-12-11 PROCEDURE — 88305 TISSUE EXAM BY PATHOLOGIST: CPT | Performed by: STUDENT IN AN ORGANIZED HEALTH CARE EDUCATION/TRAINING PROGRAM

## 2023-12-11 NOTE — PATIENT INSTRUCTIONS
Surgery After Care    Your wound will heal via secondary intention, which means no additional surgery was performed after removal of your skin cancer. The wound was left open to heal gradually over time using wound care alone. Wounds left to heal secondarily can take 2-3 months on average to heal.  The healing occurs step by step. You will notice that over the first three weeks the area will drain straw colored fluid that may have some blood within it. This is normal. Over the first three weeks, you form a nice healing base called fibrin that serves as the scaffold or map for the rest of your body's healing process. The fibrin is a thick yellow tissue. People often worry that it is pus given the yellow color. Unlike pus, this is a thick layer that cannot be rubbed off. After this, you should expect the wound to "fill in" to the surface of your skin over the course of several weeks. Lastly, a new layer of skin will form over the wound. Until your body forms a good fibrin base (which takes ~3 weeks) you should avoid immersing the wound in water (such as in baths, whirlpools, swimming pools, hot tubs, lakes and oceans). You however CAN and should wash the area daily, as instructed below. After healing, the scar will initially be red (and often times a deep red to purple color on the legs) but will gradually fade over the course of 1 year to a round scar lighter than the skin surrounding it. We advise you follow the wound care as noted below the entire time it takes for the areas to heal completely. WOUND CARE AFTER YOUR PROCEDURE    Try NOT to remove the pressure bandage for 48 hours. Keep the area clean and dry while this bandage is on. After removing the bandage for the first time, gently clean the area with soap and water. If the bandage is difficult to remove, getting the bandage wet in the shower will sometimes help soften the adhesive and allow it to be removed more easily.      You will now need to cleanse this area daily in the shower with gentle soap. There is no need to scrub the area. You will need to apply plain Vaseline ointment (this is over the counter and not a prescription) to the site until it has healed over completely followed by a clean appropriately sized bandage to area. Non stick dressing and paper tape (or Hypafix) are recommended for sensitive skin but a bandaid is fine if it covers the area well. COMPRESSION STOCKINGS   After surgery on the legs, we advise people to elevate their leg whenever possible and start using compression stockings (at least to the leg we did surgery on) to optimize the healing environment. We recommend wearing these as long as possible/tolerated throughout the day. You may purchase these over the counter. They should be 15-30 mm Hg compression level. You can purchase these within several pharmacies, or online at:   [employer]       MANAGING YOUR PAIN AFTER SURGERY     You can expect to have some pain after surgery. This is normal. The pain is typically worse the first two days after surgery, and quickly begins to get better. The best strategy for controlling your pain after surgery is around the clock pain control. You can take over the counter Acetaminophen (Tylenol) for discomfort, if no contraindications. If you are taking this at the maximum dose, you can alternate this with Motrin (ibuprofen or Advil) as well. Alternating these medications with each other allows you to maximize your pain control. In addition to Tylenol and Motrin, you can use heating pads or ice packs on your incisions to help reduce your pain. How will I alternate your regular strength over-the-counter pain medication? You will take a dose of pain medication every three hours.    Start by taking 650 mg of Tylenol (2 pills of 325 mg)   3 hours later take 600 mg of Motrin (3 pills of 200 mg)   3 hours after taking the Motrin take 650 mg of Tylenol 3 hours after that take 600 mg of Motrin. See example - if your first dose of Tylenol is at 12:00 PM     12:00 PM  Tylenol 650 mg (2 pills of 325 mg)    3:00 PM  Motrin 600 mg (3 pills of 200 mg)    6:00 PM  Tylenol 650 mg (2 pills of 325 mg)    9:00 PM  Motrin 600 mg (3 pills of 200 mg)    Continue alternating every 3 hours      Important:   Do not take more than 4000mg of Tylenol or 3200mg of Motrin in a 24-hour period. What if I still have pain? If you have pain that is not controlled with the over-the-counter pain medications (Tylenol and Motrin or Advil), don't hesitate to call our staff using the number provided. We will help make sure you are managing your pain in the best way possible, and if necessary, we can provide a prescription for additional pain medication. CALL OUR OFFICE IMMEDIATELY FOR ANY SIGNS OF INFECTION:    This includes fever, chills, increased redness, warmth, tenderness, severe discomfort/pain, or pus or foul smell coming from the wound. If you are experiencing any of the above, please call [facility] Mohs Department directly at [phone]. IF BLEEDING IS NOTICED:    Place a clean cloth over the area and apply firm pressure for thirty minutes. Check the wound ONLY after 30 minutes of direct pressure; do not cheat and sneak a peak, as that does not count. If bleeding persists after 30 minutes of legitimate direct pressure, then try one more round of direct pressure to the area. Should the bleeding become heavier or not stop after the second attempt, call [facility] Dermatology directly at [phone]. Your call will get routed to the dermatology surgeon on call even after hours.

## 2023-12-11 NOTE — PROGRESS NOTES
[facility] Note     Patient Name: Tiffany Thurman  Encounter Date: 12/11/2023     Have you been cared for by a [facility] in the last 3 years and, if so, which description applies to you? Yes. I have been here within the last 3 years, and my medical history has NOT changed since that time. I am MALE/not capable of bearing children. REVIEW OF SYSTEMS:  Have you recently had or currently have any of the following? No changes in my recent health. PAST MEDICAL HISTORY:  Have you personally ever had or currently have any of the following? If "YES," then please provide more detail. No changes in my medical history. HISTORY OF IMMUNOSUPPRESSION: Do you have a history of any of the following:  Systemic Immunosuppression such as Diabetes, Biologic or Immunotherapy, Chemotherapy, Organ Transplantation, Bone Marrow Transplantation? No     Answering "YES" requires the addition of the dotphrase "IMMUNOSUPPRESSED" as the first diagnosis of the patient's visit. FAMILY HISTORY:  Any "first degree relatives" (parent, brother, sister, or child) with the following? No changes in my family's known health. PATIENT EXPERIENCE:    Do you want the Dermatologist to perform a COMPLETE skin exam today including a clinical examination under the "bra and underwear" areas? NO  If necessary, do we have your permission to call and leave a detailed message on your Preferred Phone number that includes your specific medical information? Yes      Allergies   Allergen Reactions    Shellfish Allergy - Food Allergy      Other reaction(s): throat tightness    No current outpatient medications on file. Whom besides the patient is providing clinical information about today's encounter?    NO ADDITIONAL HISTORIAN (patient alone provided history)    Physical Exam and Assessment/Plan by Diagnosis:    PROCEDURE:  [address] CLOSURE     Attending: Andrew Moreira  Assistant: Christian Rash    Pre-Op Diagnosis: follicular adnexal neoplasm  Post-Op Diagnosis: Same  Lesion Anatomic Location: left lower leg (Previous Accession Number: [id number])  Pre-op size: 1 cm x 1.2 cm   Size of defect:  1.6 cm x 1.8cm (with 0.3 centimeter margins)  Final repaired wound length:  5.65 cm    Written and verbal, witnessed informed consent was obtained. I discussed that excision is a method of removing lesions both benign and malignant lesions. A portion of normal skin is often taken to ensure completeness of removal.  I reviewed that procedure will include numbing the area,  cutting around and under defect, undermining tissue, and closing the wound with sutures both inside and out. These sutures are usually removed in 7 to 14 days. Risks (bleeding, pain, infection, scarring, recurrence) and benefits discussed. It was discussed with patient that every effort is made to minimize scar, but scarring is influenced also by extrinsic factor such as location, age and genetics. Time Out: performed:  yes  Correct patient: yes. Correct site per Clinic Report: yes  Correct site per Patient Report: yes    LOCAL ANESTHESIA: 1% xylocaine with epi     DESCRIPTION OF PROCEDURE: The patient was brought back into the procedure room, anesthetized locally, prepped and draped in the usual fashion. Using a #15 blade with a scalpel, the lesion was excised in a disc with the above margins. Because of the location of the surgical defect, an combination purse string closure with partial secondary intent was judged to give the best possible cosmetic and functional result. The edges of the defect were carefully debrided removing any dead or coagulated tissue. The wound was then approximated by a running deep dermal suture with stepwise bites around the periphery of the wound edge with the needle exiting close to the original entry point at the wound. Sutures used are noted above.      Hemostasis was obtained by pinpoint electrocoagulation. Estimated blood loss was less than 5 mL. The patient tolerated the procedure well. The wound was dressed with petrolatum, a non-stick pad, and a compression dressing. The wound was closed with subcutaneous sutures as follows:    Deep suture:3-0 vicryl    Estimated blood loss less than 3ml. Postoperative care: Wound care discussed at length. I urged the patient to call us if any problems or question should arise. Complications: none  Post-op medications: none  Patient condition after procedure: stable  Discharge plans: Plan for wound check in 2-3 months or sooner if needed        POSTOP DISCUSSION AND INSTRUCTIONS FOR PATIENT      Rationale for Procedure  A skin excision allows the dermatologist to remove a lesion. The procedure involves a local numbing medication and removing the entire lesion. Typically, the lesion is being removed because it is atypical, traumatized, or for significant appearance reasons. The area will be open like a brush burn and allowed to heal.   There will be no sutures. Tissue is sent to pathologist who will reconfirm diagnosis and verify completeness of lesion removal.    Description of Procedure  We would like to perform a skin excision today. A local anesthetic, similar to the kind that a dentist uses when filling a cavity, will be injected with a very small needle into the skin area to be sampled. The injected skin and tissue underneath should “go to sleep” and become numbed so that no further pain should be felt. A scalpel will be used to cut around the lesion and tissue will be submitted to pathology for examination. Depending on the diagnosis the lesion will be excised with a certain amount of normal skin to help assure completeness of lesion removal.  The physician will discuss in advance the anticipated size and extent of removal.   Bleeding will occur, but it will stopped with direct pressure, sutures, and electrocautery.     Surgical “Vaseline-type” ointment will also applied after the procedure to help create a barrier between the wound and the outside world. Risks and Potential Complications  The advantage of a skin excision is that it allows us to remove a problem lesion quickly. Although this usually permits the lesion to heal as soon as possible with the least scarring, there are some risks and potential complications that include but are not limited to the following:  Some bleeding is normal at time of procedure and some bleeding on gauze is normal  the first few days after surgery. Profuse bleeding and bleeding with swelling and pain should be reported as detailed  below  Infection is uncommon in skin surgery. Infection should be reported and is indicated by pain, redness, and discharge of purulent material.  Some dull to at time sharp pain could occur initially the day after surgery. Persistent pain or increasing pain days after surgery is not expected and should be reported. Every effort is made to minimize scar, but location, size, and genetics do play a role in scar appearance. A surgical wound does not achieve its optimal appearance until 6 months. There are several treatments available if scarring would be problematic including scar creams, silicone pad, laser and scar revision. Skin discoloration can occur especially in people of color. Its important to avoid sun on wound in first 6 months after surgery. Treatment is available if pigment is problematic. Incomplete removal of the lesion or recurrence of lesion can occur and this would then require further treatment and more surgery. Nerve Damage/Numbness/Loss of Function is very rare, but is most likely to occur if lesion is large or if it is in a high risk location  Allergic Reaction to lidocaine is rare. More commonly,  epinephrine is used  with the lidocaine. Occasionally, epinephrine (aka adrenalin) may cause a brief  feeling of anxiety or jitteriness.   The person at the microscope  (pathologist) may provide additional information that was unexpected. This unexpected finding could provoke the need for additional treatment or evaluation.        Scribe Attestation      I,:  Glenny Patino am acting as a scribe while in the presence of the attending physician.:       I,:  Logan Chandler MD personally performed the services described in this documentation    as scribed in my presence.:

## 2023-12-15 PROCEDURE — 88305 TISSUE EXAM BY PATHOLOGIST: CPT | Performed by: STUDENT IN AN ORGANIZED HEALTH CARE EDUCATION/TRAINING PROGRAM

## 2023-12-19 NOTE — RESULT ENCOUNTER NOTE
Called patient to discuss results showing complete excision with clear margins and no need for further intervention. All questions answered. Instructed to please reach out via [facility] or call if any questions about incision while it continues to heal.  Will plan to see back in 2/2024, as scheduled.